# Patient Record
Sex: FEMALE | Race: WHITE | Employment: UNEMPLOYED | URBAN - METROPOLITAN AREA
[De-identification: names, ages, dates, MRNs, and addresses within clinical notes are randomized per-mention and may not be internally consistent; named-entity substitution may affect disease eponyms.]

---

## 2017-06-12 ENCOUNTER — OFFICE VISIT (OUTPATIENT)
Dept: INTERNAL MEDICINE CLINIC | Age: 32
End: 2017-06-12

## 2017-06-12 VITALS
SYSTOLIC BLOOD PRESSURE: 98 MMHG | TEMPERATURE: 97.2 F | DIASTOLIC BLOOD PRESSURE: 76 MMHG | WEIGHT: 118 LBS | BODY MASS INDEX: 22.28 KG/M2 | HEIGHT: 61 IN | HEART RATE: 65 BPM | RESPIRATION RATE: 15 BRPM | OXYGEN SATURATION: 99 %

## 2017-06-12 DIAGNOSIS — F41.9 ANXIETY: Primary | ICD-10-CM

## 2017-06-12 DIAGNOSIS — Z79.899 ENCOUNTER FOR LONG-TERM (CURRENT) USE OF MEDICATIONS: ICD-10-CM

## 2017-06-12 RX ORDER — SERTRALINE HYDROCHLORIDE 25 MG/1
25 TABLET, FILM COATED ORAL DAILY
Qty: 90 TAB | Refills: 1 | Status: SHIPPED | OUTPATIENT
Start: 2017-06-12 | End: 2017-12-09 | Stop reason: SDUPTHER

## 2017-06-12 NOTE — PROGRESS NOTES
Subjective:      Demian Mcdonnell is a 29 y.o. female who presents today for follow up of her anxiety. She was started on zoloft 25mg daily in 11/2016. She was also going to counseling still every 2 weeks. Has some family stressors, but she states that she coping with them well. She \"gets through the stress now\". She states that she is going out with friends more as well. Patient Active Problem List   Diagnosis Code    Pap smear for cervical cancer screening Z12.4    IBS (irritable bowel syndrome) K58.9    Anxiety F41.9    MDD (major depressive disorder), recurrent episode, moderate (HCC) F33.1    Skin cysts, generalized L72.9     Current Outpatient Prescriptions   Medication Sig Dispense Refill    sertraline (ZOLOFT) 25 mg tablet Take 1 Tab by mouth daily. 90 Tab 1    ergocalciferol (ERGOCALCIFEROL) 50,000 unit capsule Take 1 Cap by mouth every seven (7) days. 8 Cap 0        Review of Systems    Pertinent items are noted in HPI. Objective:     Visit Vitals    BP 98/76 (BP 1 Location: Left arm, BP Patient Position: Sitting)    Pulse 65    Temp 97.2 °F (36.2 °C) (Oral)    Resp 15    Ht 5' 1.25\" (1.556 m)    Wt 118 lb (53.5 kg)    LMP 05/29/2017 (Exact Date)    SpO2 99%    BMI 22.11 kg/m2     General appearance: alert, cooperative, no distress, appears stated age  Head: Normocephalic, without obvious abnormality, atraumatic  Lungs: clear to auscultation bilaterally  Heart: regular rate and rhythm, S1, S2 normal, no murmur, click, rub or gallop  Neurologic: Mental status: Alert, oriented, thought content appropriate, affect: stable and normal    Assessment/Plan:       1. Anxiety  -I evaluated and recommended to continue current doses of medications. 2. Encounter for long-term (current) use of medications    Orders Placed This Encounter    sertraline (ZOLOFT) 25 mg tablet     Sig: Take 1 Tab by mouth daily.      Dispense:  90 Tab     Refill:  1       Follow-up Disposition:     Follow up in 6 months     Return if symptoms worsen or fail to improve. Advised patient to call back or return to office if symptoms worsen/change/persist.     Discussed expected course/resolution/complications of diagnosis in detail with patient. Medication risks/benefits/costs/interactions/alternatives discussed with patient. Patient was given an after visit summary which includes diagnoses, current medications, & vitals. Patient expressed understanding with the diagnosis and plan.

## 2017-06-12 NOTE — PROGRESS NOTES
Chief Complaint   Patient presents with    Anxiety     6 month f/u     1. Have you been to the ER, urgent care clinic since your last visit? Hospitalized since your last visit? No    2. Have you seen or consulted any other health care providers outside of the [facility] since your last visit? Include any pap smears or colon screening.  No

## 2017-06-12 NOTE — MR AVS SNAPSHOT
Visit Information Date & Time Provider Department Dept. Phone Encounter #  
 6/12/2017  9:00 AM Eder Lehman MD [place] [address] [facility] [phone] [id number] Follow-up Instructions Return in about 6 months (around 12/12/2017) for anxiety. Upcoming Health Maintenance Date Due INFLUENZA AGE 9 TO ADULT 8/1/2017 PAP AKA CERVICAL CYTOLOGY 6/5/2018 DTaP/Tdap/Td series (2 - Td) 11/4/2026 Allergies as of 6/12/2017  Review Complete On: 6/12/2017 By: Eder Lehman MD  
 No Known Allergies Current Immunizations  Reviewed on 11/4/2016 Name Date Influenza Vaccine 10/1/2014 Tdap 11/4/2016  8:40 AM  
  
 Not reviewed this visit You Were Diagnosed With   
  
 Codes Comments Anxiety    -  Primary ICD-10-CM: F41.9 ICD-9-CM: 300.00 Encounter for long-term (current) use of medications     ICD-10-CM: Z79.899 ICD-9-CM: V58.69 Vitals BP Pulse Temp Resp Height(growth percentile) Weight(growth percentile) 98/76 (BP 1 Location: Left arm, BP Patient Position: Sitting) 65 97.2 °F (36.2 °C) (Oral) 15 5' 1.25\" (1.556 m) 118 lb (53.5 kg) LMP SpO2 BMI OB Status Smoking Status 05/29/2017 (Exact Date) 99% 22.11 kg/m2 Having regular periods Never Smoker Vitals History BMI and BSA Data Body Mass Index Body Surface Area  
 22.11 kg/m 2 1.52 m 2 Preferred Pharmacy Pharmacy Name Phone [address] [place] [address] Enid Nance AT [facility] 89. [phone] Your Updated Medication List  
  
   
This list is accurate as of: 6/12/17  9:32 AM.  Always use your most recent med list.  
  
  
  
  
 ergocalciferol 50,000 unit capsule Commonly known as:  ERGOCALCIFEROL Take 1 Cap by mouth every seven (7) days. sertraline 25 mg tablet Commonly known as:  ZOLOFT Take 1 Tab by mouth daily. Prescriptions Sent to Pharmacy  Refills  
 sertraline (ZOLOFT) 25 mg tablet 1  
 Sig: Take 1 Tab by mouth daily. Class: Normal  
 Pharmacy: [facility]eta [facility]  #: [phone] Route: Oral  
  
Follow-up Instructions Return in about 6 months (around 12/12/2017) for anxiety. Introducing [facility]! [employer] D[employer] introduces [employer] patient portal. Now you can access parts of your medical record, email your doctor's office, and request medication refills online. 1. In your internet browser, go to [email or web address] 2. Click on the First Time User? Click Here link in the Sign In box. You will see the New Member Sign Up page. 3. Enter your [employer] Access Code exactly as it appears below. You will not need to use this code after youve completed the sign-up process. If you do not sign up before the expiration date, you must request a new code. · [employer] Access Code: [email or web address] Expires: 9/10/2017  9:32 AM 
 
4. Enter the last four digits of your Social Security Number (xxxx) and Date of Birth (mm/dd/yyyy) as indicated and click Submit. You will be taken to the next sign-up page. 5. Create a [employer] ID. This will be your [employer] login ID and cannot be changed, so think of one that is secure and easy to remember. 6. Create a [employer] password. You can change your password at any time. 7. Enter your Password Reset Question and Answer. This can be used at a later time if you forget your password. 8. Enter your e-mail address. You will receive e-mail notification when new information is available in [address] 9. Click Sign Up. You can now view and download portions of your medical record. 10. Click the Download Summary menu link to download a portable copy of your medical information. If you have questions, please visit the Frequently Asked Questions section of the [employer] website. Remember, [employer] is NOT to be used for urgent needs. For medical emergencies, dial 911. Now available from your iPhone and Android! Please provide this summary of care documentation to your next provider. Your primary care clinician is listed as Faith Thomason. If you have any questions after today's visit, please call [phone].

## 2017-12-11 RX ORDER — SERTRALINE HYDROCHLORIDE 25 MG/1
TABLET, FILM COATED ORAL
Qty: 90 TAB | Refills: 0 | Status: SHIPPED | OUTPATIENT
Start: 2017-12-11 | End: 2018-02-16 | Stop reason: SDUPTHER

## 2018-02-16 ENCOUNTER — OFFICE VISIT (OUTPATIENT)
Dept: INTERNAL MEDICINE CLINIC | Age: 33
End: 2018-02-16

## 2018-02-16 VITALS
HEIGHT: 61 IN | SYSTOLIC BLOOD PRESSURE: 100 MMHG | HEART RATE: 78 BPM | DIASTOLIC BLOOD PRESSURE: 64 MMHG | BODY MASS INDEX: 22.47 KG/M2 | OXYGEN SATURATION: 100 % | TEMPERATURE: 98.8 F | WEIGHT: 119 LBS | RESPIRATION RATE: 14 BRPM

## 2018-02-16 DIAGNOSIS — F33.1 MDD (MAJOR DEPRESSIVE DISORDER), RECURRENT EPISODE, MODERATE (HCC): Chronic | ICD-10-CM

## 2018-02-16 DIAGNOSIS — Z79.899 ENCOUNTER FOR LONG-TERM (CURRENT) USE OF MEDICATIONS: ICD-10-CM

## 2018-02-16 DIAGNOSIS — F41.9 ANXIETY: Primary | ICD-10-CM

## 2018-02-16 DIAGNOSIS — Z00.00 ROUTINE ADULT HEALTH MAINTENANCE: ICD-10-CM

## 2018-02-16 RX ORDER — SERTRALINE HYDROCHLORIDE 25 MG/1
TABLET, FILM COATED ORAL
Qty: 90 TAB | Refills: 1 | Status: SHIPPED | OUTPATIENT
Start: 2018-02-16 | End: 2018-08-17 | Stop reason: DRUGHIGH

## 2018-02-16 NOTE — MR AVS SNAPSHOT
[address] [facility], [address] [place] [address] 
[phone] Patient: Marlen Randall MRN: [id number] PXB:[birth year] Visit Information Date & Time Provider Department Dept. Phone Encounter #  
 2/16/2018 11:20 AM [facility] [phone] [id number] Follow-up Instructions Return in about 6 months (around 8/16/2018) for pap and followup- physical appointment. Upcoming Health Maintenance Date Due  
 PAP AKA CERVICAL CYTOLOGY 6/5/2018 DTaP/Tdap/Td series (2 - Td) 11/4/2026 Allergies as of 2/16/2018  Review Complete On: 2/16/2018 By: Judith Fraser MD  
 No Known Allergies Current Immunizations  Reviewed on 11/4/2016 Name Date Influenza Vaccine 10/16/2017, 10/1/2014 Tdap 11/4/2016  8:40 AM  
  
 Not reviewed this visit You Were Diagnosed With   
  
 Codes Comments Anxiety    -  Primary ICD-10-CM: F41.9 ICD-9-CM: 300.00 Encounter for long-term (current) use of medications     ICD-10-CM: Z79.899 ICD-9-CM: V58.69 Routine adult health maintenance     ICD-10-CM: Z00.00 ICD-9-CM: V70.0 Vitals BP Pulse Temp Resp Height(growth percentile) Weight(growth percentile) 100/64 (BP 1 Location: Left arm, BP Patient Position: Sitting) 78 98.8 °F (37.1 °C) (Oral) 14 5' 1.25\" (1.556 m) 119 lb (54 kg) LMP SpO2 BMI OB Status Smoking Status 01/21/2018 (Exact Date) 100% 22.3 kg/m2 Having regular periods Never Smoker Vitals History BMI and BSA Data Body Mass Index Body Surface Area  
 22.3 kg/m 2 1.53 m 2 Preferred Pharmacy Pharmacy Name Phone [facility]ika Yen AT [facility]. [phone] Your Updated Medication List  
  
   
This list is accurate as of: 2/16/18 11:38 AM.  Always use your most recent med list.  
  
  
  
  
 sertraline 25 mg tablet Commonly known as:  ZOLOFT  
 TAKE 1 TABLET BY MOUTH DAILY Prescriptions Sent to Pharmacy Refills  
 sertraline (ZOLOFT) 25 mg tablet 1 Sig: TAKE 1 TABLET BY MOUTH DAILY Class: Normal  
 Pharmacy: [facility] Anton[facility]eta Berg  #: [phone] We Performed the Following CBC WITH AUTOMATED DIFF [80899 CPT(R)] LIPID PANEL [44913 CPT(R)] METABOLIC PANEL, COMPREHENSIVE [52853 CPT(R)] URINALYSIS W/ RFLX MICROSCOPIC [49195 CPT(R)] Follow-up Instructions Return in about 6 months (around 8/16/2018) for pap and followup- physical appointment. Introducing [facility]! [facility] introduces [employer] patient portal. Now you can access parts of your medical record, email your doctor's office, and request medication refills online. 1. In your internet browser, go to [email or web address] 2. Click on the First Time User? Click Here link in the Sign In box. You will see the New Member Sign Up page. 3. Enter your [employer] Access Code exactly as it appears below. You will not need to use this code after youve completed the sign-up process. If you do not sign up before the expiration date, you must request a new code. · [employer] Access Code: [email or web address] Expires: 5/17/2018 11:34 AM 
 
4. Enter the last four digits of your Social Security Number (xxxx) and Date of Birth (mm/dd/yyyy) as indicated and click Submit. You will be taken to the next sign-up page. 5. Create a [employer]anc[employer] ID. This will be your [employer] login ID and cannot be changed, so think of one that is secure and easy to remember. 6. Create a [employer] password. You can change your password at any time. 7. Enter your Password Reset Question and Answer. This can be used at a later time if you forget your password. 8. Enter your e-mail address. You will receive e-mail notification when new information is available in [address] 9. Click Sign Up. You can now view and download portions of your medical record. 10. Click the Download Summary menu link to download a portable copy of your medical information. If you have questions, please visit the Frequently Asked Questions section of the [employer] website. Remember, [employer] is NOT to be used for urgent needs. For medical emergencies, dial 911. Now available from your iPhone and Android! Please provide this summary of care documentation to your next provider. Your primary care clinician is listed as Faith Thomason. If you have any questions after today's visit, please call [phone].

## 2018-02-16 NOTE — ASSESSMENT & PLAN NOTE
Stable, based on history, physical exam and review of pertinent labs, studies and medications; meds reconciled; continue current treatment plan. Key Psychotherapeutic Meds             sertraline (ZOLOFT) 25 mg tablet  (Taking) TAKE 1 TABLET BY MOUTH DAILY        Other [address] [place] [address]     The patient is on no other behavioral health meds.         Lab Results   Component Value Date/Time    Sodium 141 11/03/2016 09:15 AM    Creatinine 0.69 11/03/2016 09:15 AM    TSH 1.530 11/03/2016 09:15 AM    WBC 4.2 11/03/2016 09:15 AM    ALT (SGPT) 11 11/03/2016 09:15 AM    AST (SGOT) 20 11/03/2016 09:15 AM

## 2018-02-16 NOTE — PROGRESS NOTES
Patient's identity verified with two patient identifiers (name and date of birth). 1. Have you been to the ER, urgent care clinic since your last visit? Hospitalized since your last visit? No  2. Have you seen or consulted any other health care providers outside of the [address] [place] [address] since your last visit? Include any pap smears or colon screening. No    Chief Complaint   Patient presents with    Anxiety     6 month follow up, doing wlel on Zoloft. Has been off x2 wks because ran out & complains of irritability.  Sneezing     x2 days with increased sneezing, clear mucous. Intermittent nasal congestion. Denies sore throat, coughing, body aches/fever, or ear pain. Fasting. Health Maintenance Due   Topic Date Due    Influenza Age 5 to Adult   Done per patient, 10/2017. 08/01/2017    PAP AKA CERVICAL CYTOLOGY   Due 6/2018, sees us (Dr. Elinor Coker) for this, No GYN care elsewhere. 06/05/2018     Reviewed record in preparation for visit and have obtained necessary documentation.     Wt Readings from Last 3 Encounters:   02/16/18 119 lb (54 kg)   06/12/17 118 lb (53.5 kg)   12/05/16 110 lb (49.9 kg)     Temp Readings from Last 3 Encounters:   02/16/18 98.8 °F (37.1 °C) (Oral)   06/12/17 97.2 °F (36.2 °C) (Oral)   12/05/16 98 °F (36.7 °C) (Oral)     BP Readings from Last 3 Encounters:   02/16/18 100/64   06/12/17 98/76   12/05/16 112/80     Pulse Readings from Last 3 Encounters:   02/16/18 78   06/12/17 65   12/05/16 70

## 2018-02-17 LAB
ALBUMIN SERPL-MCNC: 4.3 G/DL (ref 3.5–5.5)
ALBUMIN/GLOB SERPL: 1.5 {RATIO} (ref 1.2–2.2)
ALP SERPL-CCNC: 43 IU/L (ref 39–117)
ALT SERPL-CCNC: 10 IU/L (ref 0–32)
APPEARANCE UR: CLEAR
AST SERPL-CCNC: 17 IU/L (ref 0–40)
BASOPHILS # BLD AUTO: 0 X10E3/UL (ref 0–0.2)
BASOPHILS NFR BLD AUTO: 0 %
BILIRUB SERPL-MCNC: 0.5 MG/DL (ref 0–1.2)
BILIRUB UR QL STRIP: NEGATIVE
BUN SERPL-MCNC: 9 MG/DL (ref 6–20)
BUN/CREAT SERPL: 12 (ref 9–23)
CALCIUM SERPL-MCNC: 9 MG/DL (ref 8.7–10.2)
CHLORIDE SERPL-SCNC: 105 MMOL/L (ref 96–106)
CHOLEST SERPL-MCNC: 153 MG/DL (ref 100–199)
CO2 SERPL-SCNC: 21 MMOL/L (ref 18–29)
COLOR UR: YELLOW
CREAT SERPL-MCNC: 0.73 MG/DL (ref 0.57–1)
EOSINOPHIL # BLD AUTO: 0 X10E3/UL (ref 0–0.4)
EOSINOPHIL NFR BLD AUTO: 1 %
ERYTHROCYTE [DISTWIDTH] IN BLOOD BY AUTOMATED COUNT: 12.7 % (ref 12.3–15.4)
GFR SERPLBLD CREATININE-BSD FMLA CKD-EPI: 111 ML/MIN/1.73
GFR SERPLBLD CREATININE-BSD FMLA CKD-EPI: 128 ML/MIN/1.73
GLOBULIN SER CALC-MCNC: 2.9 G/DL (ref 1.5–4.5)
GLUCOSE SERPL-MCNC: 81 MG/DL (ref 65–99)
GLUCOSE UR QL: NEGATIVE
HCT VFR BLD AUTO: 38 % (ref 34–46.6)
HDLC SERPL-MCNC: 65 MG/DL
HGB BLD-MCNC: 12.3 G/DL (ref 11.1–15.9)
HGB UR QL STRIP: NEGATIVE
IMM GRANULOCYTES # BLD: 0 X10E3/UL (ref 0–0.1)
IMM GRANULOCYTES NFR BLD: 0 %
INTERPRETATION, 910389: NORMAL
KETONES UR QL STRIP: NEGATIVE
LDLC SERPL CALC-MCNC: 73 MG/DL (ref 0–99)
LEUKOCYTE ESTERASE UR QL STRIP: NEGATIVE
LYMPHOCYTES # BLD AUTO: 1.9 X10E3/UL (ref 0.7–3.1)
LYMPHOCYTES NFR BLD AUTO: 38 %
MCH RBC QN AUTO: 30.8 PG (ref 26.6–33)
MCHC RBC AUTO-ENTMCNC: 32.4 G/DL (ref 31.5–35.7)
MCV RBC AUTO: 95 FL (ref 79–97)
MICRO URNS: ABNORMAL
MONOCYTES # BLD AUTO: 0.4 X10E3/UL (ref 0.1–0.9)
MONOCYTES NFR BLD AUTO: 8 %
NEUTROPHILS # BLD AUTO: 2.7 X10E3/UL (ref 1.4–7)
NEUTROPHILS NFR BLD AUTO: 53 %
NITRITE UR QL STRIP: NEGATIVE
PH UR STRIP: 8.5 [PH] (ref 5–7.5)
PLATELET # BLD AUTO: 268 X10E3/UL (ref 150–379)
POTASSIUM SERPL-SCNC: 4.7 MMOL/L (ref 3.5–5.2)
PROT SERPL-MCNC: 7.2 G/DL (ref 6–8.5)
PROT UR QL STRIP: NEGATIVE
RBC # BLD AUTO: 3.99 X10E6/UL (ref 3.77–5.28)
SODIUM SERPL-SCNC: 141 MMOL/L (ref 134–144)
SP GR UR: 1.02 (ref 1–1.03)
TRIGL SERPL-MCNC: 75 MG/DL (ref 0–149)
UROBILINOGEN UR STRIP-MCNC: 0.2 MG/DL (ref 0.2–1)
VLDLC SERPL CALC-MCNC: 15 MG/DL (ref 5–40)
WBC # BLD AUTO: 5.1 X10E3/UL (ref 3.4–10.8)

## 2018-08-17 ENCOUNTER — OFFICE VISIT (OUTPATIENT)
Dept: INTERNAL MEDICINE CLINIC | Age: 33
End: 2018-08-17

## 2018-08-17 ENCOUNTER — HOSPITAL ENCOUNTER (OUTPATIENT)
Dept: LAB | Age: 33
Discharge: HOME | End: 2018-08-17
Payer: COMMERCIAL

## 2018-08-17 VITALS
DIASTOLIC BLOOD PRESSURE: 62 MMHG | TEMPERATURE: 97.3 F | RESPIRATION RATE: 13 BRPM | HEIGHT: 61 IN | WEIGHT: 121 LBS | SYSTOLIC BLOOD PRESSURE: 104 MMHG | HEART RATE: 73 BPM | BODY MASS INDEX: 22.84 KG/M2 | OXYGEN SATURATION: 100 %

## 2018-08-17 DIAGNOSIS — F41.8 DEPRESSION WITH ANXIETY: ICD-10-CM

## 2018-08-17 DIAGNOSIS — Z79.899 ENCOUNTER FOR LONG-TERM (CURRENT) USE OF MEDICATIONS: ICD-10-CM

## 2018-08-17 DIAGNOSIS — Z01.419 WELL WOMAN EXAM WITH ROUTINE GYNECOLOGICAL EXAM: Primary | ICD-10-CM

## 2018-08-17 PROCEDURE — 88175 CYTOPATH C/V AUTO FLUID REDO: CPT | Performed by: INTERNAL MEDICINE

## 2018-08-17 RX ORDER — SERTRALINE HYDROCHLORIDE 50 MG/1
50 TABLET, FILM COATED ORAL DAILY
Qty: 90 TAB | Refills: 0 | Status: SHIPPED | OUTPATIENT
Start: 2018-08-17 | End: 2019-02-20 | Stop reason: ALTCHOICE

## 2018-08-17 NOTE — PROGRESS NOTES
Patient's identity verified with two patient identifiers (name and date of birth). Reviewed record in preparation for visit and have obtained necessary documentation. 1. Have you been to the ER, urgent care clinic since your last visit? Hospitalized since your last visit? No  2. Have you seen or consulted any other health care providers outside of the [address] [place] [address] since your last visit? Include any pap smears or colon screening. No    Chief Complaint   Patient presents with    Complete Physical     Fasting. Pap smear due. Left buttocks pain, 5/10, \"nerve pain\", sharp, x2 days, occurs w/ walking.  Depression     6 month follow up, taking Zoloft, requesting refill. Complains of night sweats, wakes up a lot. Thinking she needs to increase dose. Fasting.     Health Maintenance Due   Topic Date Due    PAP AKA CERVICAL CYTOLOGY   Due, done today 06/05/2018       Wt Readings from Last 3 Encounters:   08/17/18 121 lb (54.9 kg)   02/16/18 119 lb (54 kg)   06/12/17 118 lb (53.5 kg)     Temp Readings from Last 3 Encounters:   08/17/18 97.3 °F (36.3 °C) (Oral)   02/16/18 98.8 °F (37.1 °C) (Oral)   06/12/17 97.2 °F (36.2 °C) (Oral)     BP Readings from Last 3 Encounters:   08/17/18 104/62   02/16/18 100/64   06/12/17 98/76     Pulse Readings from Last 3 Encounters:   08/17/18 73   02/16/18 78   06/12/17 65       Learning Assessment:  :     Learning Assessment 8/17/2018 6/12/2017 4/15/2015 5/17/2013   PRIMARY LEARNER Patient Patient Patient Patient   HIGHEST LEVEL OF EDUCATION - PRIMARY LEARNER  4 YEARS OF COLLEGE 4 YEARS OF COLLEGE 4 YEARS OF COLLEGE -   BARRIERS PRIMARY LEARNER NONE NONE NONE -   CO-LEARNER CAREGIVER No No No -   PRIMARY LANGUAGE ENGLISH ENGLISH ENGLISH ENGLISH   LEARNER PREFERENCE PRIMARY PICTURES DEMONSTRATION READING OTHER (COMMENT)     READING PICTURES - -   ANSWERED BY patient self patient patient   RELATIONSHIP SELF SELF SELF SELF       Depression Screening:  :     PHQ over the last two weeks 8/17/2018   Little interest or pleasure in doing things Nearly every day   Feeling down, depressed, irritable, or hopeless Several days   Total Score PHQ 2 4       Abuse Screening:  :     Abuse Screening Questionnaire 8/17/2018 6/12/2017 4/15/2015 4/1/2014   Do you ever feel afraid of your partner? N N N N   Are you in a relationship with someone who physically or mentally threatens you? N N N N   Is it safe for you to go home?  Andrew Sen

## 2018-08-17 NOTE — MR AVS SNAPSHOT
[address] [facility], [address] [place] [address] 
[phone] Patient: Amberly Kim MRN: [id number] NGY:[birth year] Visit Information Date & Time Provider Department Dept. Phone Encounter #  
 8/17/2018  8:20 AM Nora Rose MD [place] [address] [facility] [phone] Follow-up Instructions Return in about 6 months (around 2/17/2019). Upcoming Health Maintenance Date Due Influenza Age 5 to Adult 10/1/2018* PAP AKA CERVICAL CYTOLOGY 8/17/2021 DTaP/Tdap/Td series (2 - Td) 11/4/2026 *Topic was postponed. The date shown is not the original due date. Allergies as of 8/17/2018  Review Complete On: 8/17/2018 By: Nora Rose MD  
 No Known Allergies Current Immunizations  Reviewed on 8/17/2018 Name Date Influenza Vaccine 10/16/2017, 10/1/2014 Tdap 11/4/2016  8:40 AM  
  
 Reviewed by Nora Rose MD on 8/17/2018 at  8:33 AM  
You Were Diagnosed With   
  
 Codes Comments Well woman exam with routine gynecological exam    -  Primary ICD-10-CM: F86.516 ICD-9-CM: V72.31 Vitals BP Pulse Temp Resp Height(growth percentile) Weight(growth percentile) 104/62 (BP 1 Location: Left arm, BP Patient Position: Sitting) 73 97.3 °F (36.3 °C) (Oral) 13 5' 1.25\" (1.556 m) 121 lb (54.9 kg) LMP SpO2 BMI OB Status Smoking Status 07/22/2018 (Exact Date) 100% 22.68 kg/m2 Having regular periods Never Smoker Vitals History BMI and BSA Data Body Mass Index Body Surface Area  
 22.68 kg/m 2 1.54 m 2 Preferred Pharmacy Pharmacy Name Phone [address] [place] [address] Cici Roney AT [facility] [address] [facility] 89. [phone] Your Updated Medication List  
  
   
This list is accurate as of 8/17/18  9:04 AM.  Always use your most recent med list.  
  
  
  
  
 sertraline 50 mg tablet Commonly known as:  ZOLOFT Take 1 Tab by mouth daily. Prescriptions Sent to Pharmacy Refills  
 sertraline (ZOLOFT) 50 mg tablet 0 Sig: Take 1 Tab by mouth daily. Class: Normal  
 Pharmacy: [facility]  #: [phone] Route: Oral  
  
We Performed the Following PAP IG, [address] HPV ASCU, [id number]) [[id number] Custom] Follow-up Instructions Return in about 6 months (around 2/17/2019). Introducing [facility] HE[facility]! [employer] introduces [employer] patient portal. Now you can access parts of your medical record, email your doctor's office, and request medication refills online. 1. In your internet browser, go to [email or web address] 2. Click on the First Time User? Click Here link in the Sign In box. You will see the New Member Sign Up page. 3. Enter your [employer] Access Code exactly as it appears below. You will not need to use this code after youve completed the sign-up process. If you do not sign up before the expiration date, you must request a new code. · [employer] Access Code: [email or web address] Expires: 11/15/2018  8:33 AM 
 
4. Enter the last four digits of your Social Security Number (xxxx) and Date of Birth (mm/dd/yyyy) as indicated and click Submit. You will be taken to the next sign-up page. 5. Create a [employer] ID. This will be your [employer] login ID and cannot be changed, so think of one that is secure and easy to remember. 6. Create a [employer] password. You can change your password at any time. 7. Enter your Password Reset Question and Answer. This can be used at a later time if you forget your password. 8. Enter your e-mail address. You will receive e-mail notification when new information is available in [address] 9. Click Sign Up. You can now view and download portions of your medical record. 10. Click the Download Summary menu link to download a portable copy of your medical information. If you have questions, please visit the Frequently Asked Questions section of the [employer] website. Remember, [employer] is NOT to be used for urgent needs. For medical emergencies, dial 911. Now available from your iPhone and Android! Please provide this summary of care documentation to your next provider. Your primary care clinician is listed as Faith Thomason. If you have any questions after today's visit, please call [phone].

## 2018-08-17 NOTE — PROGRESS NOTES
Subjective:      Drake Schaffer is a 34 y.o. female who presents today for her well woman's exam and also for follow up of her anxiety. she is still going to counseling every other week. She is still exercising regularly. She is feeling a little more anhedonia. She just feels tired all the time which is her typical symptom of her depression. She is not suicidal.    Her menstrual cycles are very regular, every 3 weeks, lasts about 6 days. Patient Active Problem List   Diagnosis Code    Pap smear for cervical cancer screening Z12.4    IBS (irritable bowel syndrome) K58.9    Anxiety F41.9    MDD (major depressive disorder), recurrent episode, moderate (HCC) F33.1    Skin cysts, generalized L72.9     Current Outpatient Prescriptions   Medication Sig Dispense Refill    sertraline (ZOLOFT) 25 mg tablet TAKE 1 TABLET BY MOUTH DAILY 90 Tab 1        Review of Systems    A comprehensive review of systems was negative except for that written in the HPI. Objective:     Visit Vitals    /62 (BP 1 Location: Left arm, BP Patient Position: Sitting)    Pulse 73    Temp 97.3 °F (36.3 °C) (Oral)    Resp 13    Ht 5' 1.25\" (1.556 m)    Wt 121 lb (54.9 kg)    LMP 07/22/2018 (Exact Date)    SpO2 100%    BMI 22.68 kg/m2     General appearance: alert, cooperative, no distress, appears stated age  Head: Normocephalic, without obvious abnormality, atraumatic  Neck: supple, symmetrical, trachea midline, no adenopathy, no carotid bruit and no JVD  Lungs: clear to auscultation bilaterally  Breasts: normal appearance, no masses or tenderness  Heart: regular rate and rhythm, S1, S2 normal, no murmur, click, rub or gallop  Abdomen: soft, non-tender.  Bowel sounds normal. No masses,  no organomegaly  Pelvic: External genitalia normal, Vagina normal without discharge, cervix normal in appearance, no CMT, uterus normal size, shape, and consistency, no adnexal masses or tenderness, rectovaginal septum normal    Assessment/Plan:     1. Well woman exam with routine gynecological exam  -pap done today. - PAP IG, RFX HPV ASCU, 16&18,45([id number])    Also, she has additional complaints of the following --.     2. Depression with anxiety  -has increased anhedonia. She is still working with her counselor. She has very good insight  -will increase her zoloft from 25mg to 50mg daily at this time. 3. Encounter for long-term (current) use of medications    Orders Placed This Encounter    sertraline (ZOLOFT) 50 mg tablet     Sig: Take 1 Tab by mouth daily. Dispense:  90 Tab     Refill:  0    PAP IG, RFX HPV ASCU, 13&69,79([id number])     Order Specific Question:   Pap Source? Answer:   Cervical     Order Specific Question:   Total Hysterectomy? Answer:   No     Order Specific Question:   Supracervical Hysterectomy? Answer:   No     Order Specific Question:   Post Menopausal?     Answer:   No     Order Specific Question:   Hormone Therapy? Answer:   No     Order Specific Question:   IUD? Answer:   No     Order Specific Question:   Abnormal Bleeding? Answer:   No     Order Specific Question:   Pregnant     Answer:   No     Order Specific Question:   Post Partum? Answer:   No         Follow-up Disposition:     Follow up in 6 months     Return if symptoms worsen or fail to improve. Advised patient to call back or return to office if symptoms worsen/change/persist.     Discussed expected course/resolution/complications of diagnosis in detail with patient. Medication risks/benefits/costs/interactions/alternatives discussed with patient. Patient was given an after visit summary which includes diagnoses, current medications, & vitals. Patient expressed understanding with the diagnosis and plan.

## 2019-02-20 ENCOUNTER — OFFICE VISIT (OUTPATIENT)
Dept: INTERNAL MEDICINE CLINIC | Age: 34
End: 2019-02-20

## 2019-02-20 VITALS
OXYGEN SATURATION: 100 % | WEIGHT: 129 LBS | BODY MASS INDEX: 24.35 KG/M2 | HEART RATE: 83 BPM | RESPIRATION RATE: 16 BRPM | HEIGHT: 61 IN | SYSTOLIC BLOOD PRESSURE: 106 MMHG | TEMPERATURE: 98.9 F | DIASTOLIC BLOOD PRESSURE: 70 MMHG

## 2019-02-20 DIAGNOSIS — F41.8 ANXIETY WITH DEPRESSION: Primary | ICD-10-CM

## 2019-02-20 DIAGNOSIS — Z79.899 ENCOUNTER FOR LONG-TERM (CURRENT) USE OF MEDICATIONS: ICD-10-CM

## 2019-02-20 DIAGNOSIS — Z00.00 ROUTINE ADULT HEALTH MAINTENANCE: ICD-10-CM

## 2019-02-20 DIAGNOSIS — N92.6 ABNORMAL MENSES: ICD-10-CM

## 2019-02-20 RX ORDER — DULOXETIN HYDROCHLORIDE 30 MG/1
30 CAPSULE, DELAYED RELEASE ORAL DAILY
Qty: 7 CAP | Refills: 0 | Status: SHIPPED | OUTPATIENT
Start: 2019-02-20 | End: 2019-03-13

## 2019-02-20 RX ORDER — DULOXETIN HYDROCHLORIDE 60 MG/1
60 CAPSULE, DELAYED RELEASE ORAL DAILY
Qty: 30 CAP | Refills: 0 | Status: SHIPPED | OUTPATIENT
Start: 2019-02-20 | End: 2019-03-20 | Stop reason: SDUPTHER

## 2019-02-20 NOTE — PROGRESS NOTES
Smitha Fox is a 34 y.o. female Chief Complaint Patient presents with  Anxiety f/u- 6 month 1. Have you been to the ER, urgent care clinic since your last visit? Hospitalized since your last visit? No 
 
2. Have you seen or consulted any other health care providers outside of the [address] [place] [address] since your last visit? Include any pap smears or colon screening. No  
 
Visit Vitals /70 (BP 1 Location: Left arm, BP Patient Position: Sitting) Pulse 83 Temp 98.9 °F (37.2 °C) (Oral) Resp 16 Ht 5' 1.25\" (1.556 m) Wt 129 lb (58.5 kg) SpO2 100% BMI 24.18 kg/m² Sunny Briscoe LPN

## 2019-02-20 NOTE — PROGRESS NOTES
Subjective:  
  
Radha Huber is a 34 y.o. female who presents today for follow up of her anxiety. Her zoloft was increased on 8/17/18 at her last visit from 25mg to 50mg daily. She was also in counseling regularly. She stopped taking her zoloft about the end of august, 2018. She didn't think she needed it any more. She is very tearful and and her anxiety is back . She felt also that the zoloft was very blunting of her moods even on the low dose. She had celexa as a teen and she had significant side effects as well. She has continued to exercise regularly. Patient Active Problem List  
Diagnosis Code  Pap smear for cervical cancer screening Z12.4  
 IBS (irritable bowel syndrome) K58.9  Anxiety F41.9  MDD (major depressive disorder), recurrent episode, moderate (HCC) F33.1  Skin cysts, generalized L72.9 Current Outpatient Medications Medication Sig Dispense Refill  sertraline (ZOLOFT) 50 mg tablet Take 1 Tab by mouth daily. 90 Tab 0 Review of Systems Pertinent items are noted in HPI. Objective:  
 
Visit Vitals /70 (BP 1 Location: Left arm, BP Patient Position: Sitting) Pulse 83 Temp 98.9 °F (37.2 °C) (Oral) Resp 16 Ht 5' 1.25\" (1.556 m) Wt 129 lb (58.5 kg) LMP 01/18/2019 (Exact Date) SpO2 100% BMI 24.18 kg/m² General appearance: alert, cooperative, no distress, appears stated age Head: Normocephalic, without obvious abnormality, atraumatic Lungs: clear to auscultation bilaterally Heart: regular rate and rhythm, S1, S2 normal, no murmur, click, rub or gallop Neurologic: Mental status: Alert, oriented, thought content appropriate, affect: tearful. Assessment/Plan: 1. Anxiety with depression 
-continue with counseling 
-given trial of cymbalta. Start 30mg daily for 7 days, then increase to 60mg daily. Return in 3 weeks for evaluation.  
 
- TSH 3RD GENERATION 2.  Routine adult health maintenance 
 
- CBC WITH AUTOMATED DIFF 
 - METABOLIC PANEL, COMPREHENSIVE 
- LIPID PANEL 
- UA/M W/RFLX CULTURE, ROUTINE 3. Encounter for long-term (current) use of medications 4. Abnormal menses 
-recommended gyn referral 
 
- TSH 3RD GENERATION 
- REFERRAL TO OBSTETRICS AND GYNECOLOGY Follow-up Disposition:  
 
follow up in 3 weeks Return if symptoms worsen or fail to improve. Advised patient to call back or return to office if symptoms worsen/change/persist.  
 
Discussed expected course/resolution/complications of diagnosis in detail with patient. Medication risks/benefits/costs/interactions/alternatives discussed with patient. Patient was given an after visit summary which includes diagnoses, current medications, & vitals. Patient expressed understanding with the diagnosis and plan.

## 2019-02-22 LAB
ALBUMIN SERPL-MCNC: 4.9 G/DL (ref 3.5–5.5)
ALBUMIN/GLOB SERPL: 1.6 {RATIO} (ref 1.2–2.2)
ALP SERPL-CCNC: 53 IU/L (ref 39–117)
ALT SERPL-CCNC: 8 IU/L (ref 0–32)
APPEARANCE UR: CLEAR
AST SERPL-CCNC: 14 IU/L (ref 0–40)
BACTERIA #/AREA URNS HPF: NORMAL /[HPF]
BACTERIA UR CULT: NO GROWTH
BASOPHILS # BLD AUTO: 0 X10E3/UL (ref 0–0.2)
BASOPHILS NFR BLD AUTO: 0 %
BILIRUB SERPL-MCNC: 0.5 MG/DL (ref 0–1.2)
BILIRUB UR QL STRIP: NEGATIVE
BUN SERPL-MCNC: 12 MG/DL (ref 6–20)
BUN/CREAT SERPL: 14 (ref 9–23)
CALCIUM SERPL-MCNC: 9.8 MG/DL (ref 8.7–10.2)
CASTS URNS QL MICRO: NORMAL /LPF
CHLORIDE SERPL-SCNC: 104 MMOL/L (ref 96–106)
CHOLEST SERPL-MCNC: 169 MG/DL (ref 100–199)
CO2 SERPL-SCNC: 17 MMOL/L (ref 20–29)
COLOR UR: YELLOW
CREAT SERPL-MCNC: 0.83 MG/DL (ref 0.57–1)
EOSINOPHIL # BLD AUTO: 0 X10E3/UL (ref 0–0.4)
EOSINOPHIL NFR BLD AUTO: 1 %
EPI CELLS #/AREA URNS HPF: NORMAL /HPF
ERYTHROCYTE [DISTWIDTH] IN BLOOD BY AUTOMATED COUNT: 13.3 % (ref 12.3–15.4)
GLOBULIN SER CALC-MCNC: 3 G/DL (ref 1.5–4.5)
GLUCOSE SERPL-MCNC: 67 MG/DL (ref 65–99)
GLUCOSE UR QL: NEGATIVE
HCT VFR BLD AUTO: 38.6 % (ref 34–46.6)
HDLC SERPL-MCNC: 66 MG/DL
HGB BLD-MCNC: 13 G/DL (ref 11.1–15.9)
HGB UR QL STRIP: ABNORMAL
IMM GRANULOCYTES # BLD AUTO: 0 X10E3/UL (ref 0–0.1)
IMM GRANULOCYTES NFR BLD AUTO: 0 %
INTERPRETATION, 910389: NORMAL
KETONES UR QL STRIP: ABNORMAL
LDLC SERPL CALC-MCNC: 90 MG/DL (ref 0–99)
LEUKOCYTE ESTERASE UR QL STRIP: ABNORMAL
LYMPHOCYTES # BLD AUTO: 1.9 X10E3/UL (ref 0.7–3.1)
LYMPHOCYTES NFR BLD AUTO: 37 %
MCH RBC QN AUTO: 30.5 PG (ref 26.6–33)
MCHC RBC AUTO-ENTMCNC: 33.7 G/DL (ref 31.5–35.7)
MCV RBC AUTO: 91 FL (ref 79–97)
MICRO URNS: ABNORMAL
MONOCYTES # BLD AUTO: 0.4 X10E3/UL (ref 0.1–0.9)
MONOCYTES NFR BLD AUTO: 8 %
MUCOUS THREADS URNS QL MICRO: PRESENT
NEUTROPHILS # BLD AUTO: 2.7 X10E3/UL (ref 1.4–7)
NEUTROPHILS NFR BLD AUTO: 54 %
NITRITE UR QL STRIP: NEGATIVE
PH UR STRIP: 5.5 [PH] (ref 5–7.5)
PLATELET # BLD AUTO: 295 X10E3/UL (ref 150–379)
POTASSIUM SERPL-SCNC: 4.2 MMOL/L (ref 3.5–5.2)
PROT SERPL-MCNC: 7.9 G/DL (ref 6–8.5)
PROT UR QL STRIP: NEGATIVE
RBC # BLD AUTO: 4.26 X10E6/UL (ref 3.77–5.28)
RBC #/AREA URNS HPF: NORMAL /HPF
SODIUM SERPL-SCNC: 141 MMOL/L (ref 134–144)
SP GR UR: 1.03 (ref 1–1.03)
TRIGL SERPL-MCNC: 63 MG/DL (ref 0–149)
TSH SERPL DL<=0.005 MIU/L-ACNC: 1.41 UIU/ML (ref 0.45–4.5)
URINALYSIS REFLEX, 377202: ABNORMAL
UROBILINOGEN UR STRIP-MCNC: 0.2 MG/DL (ref 0.2–1)
VLDLC SERPL CALC-MCNC: 13 MG/DL (ref 5–40)
WBC # BLD AUTO: 5.1 X10E3/UL (ref 3.4–10.8)
WBC #/AREA URNS HPF: NORMAL /HPF

## 2019-03-13 ENCOUNTER — OFFICE VISIT (OUTPATIENT)
Dept: INTERNAL MEDICINE CLINIC | Age: 34
End: 2019-03-13

## 2019-03-13 VITALS
SYSTOLIC BLOOD PRESSURE: 106 MMHG | RESPIRATION RATE: 15 BRPM | HEART RATE: 97 BPM | WEIGHT: 125 LBS | HEIGHT: 61 IN | BODY MASS INDEX: 23.6 KG/M2 | TEMPERATURE: 99.4 F | DIASTOLIC BLOOD PRESSURE: 68 MMHG | OXYGEN SATURATION: 99 %

## 2019-03-13 DIAGNOSIS — Z79.899 ENCOUNTER FOR LONG-TERM (CURRENT) USE OF MEDICATIONS: ICD-10-CM

## 2019-03-13 DIAGNOSIS — J02.9 SORE THROAT: ICD-10-CM

## 2019-03-13 DIAGNOSIS — F41.8 ANXIETY WITH DEPRESSION: Primary | ICD-10-CM

## 2019-03-13 DIAGNOSIS — R50.9 LOW GRADE FEVER: ICD-10-CM

## 2019-03-13 LAB
FLUAV+FLUBV AG NOSE QL IA.RAPID: NEGATIVE POS/NEG
FLUAV+FLUBV AG NOSE QL IA.RAPID: NEGATIVE POS/NEG
S PYO AG THROAT QL: NEGATIVE
VALID INTERNAL CONTROL?: YES
VALID INTERNAL CONTROL?: YES

## 2019-03-13 NOTE — PROGRESS NOTES
Subjective:      Keisha Shukla is a 34 y.o. female who presents today for follow up of her depression with anxiety. She was weaned from zoloft to cymbalta as the zoloft was no longer helping her control her symptoms. She states that her anhedonia is much better. She has been on the full dose of cymbalta 60mg daily for 2 weeks now. She is taking it at night because it causes some sleepiness. She has also developed cold symptoms including head congestion, sore throat, cough with some production. Has used otc meds with some efficacy. Patient Active Problem List   Diagnosis Code    Pap smear for cervical cancer screening Z12.4    IBS (irritable bowel syndrome) K58.9    Anxiety F41.9    MDD (major depressive disorder), recurrent episode, moderate (HCC) F33.1    Skin cysts, generalized L72.9     Current Outpatient Medications   Medication Sig Dispense Refill    DULoxetine (CYMBALTA) 60 mg capsule Take 1 Cap by mouth daily. 30 Cap 0        Review of Systems    Pertinent items are noted in HPI.      Objective:     Visit Vitals  /68 (BP 1 Location: Left arm, BP Patient Position: Sitting)   Pulse 97   Temp 99.4 °F (37.4 °C) (Oral)   Resp 15   Ht 5' 1.25\" (1.556 m)   Wt 125 lb (56.7 kg)   LMP 02/20/2019 (Exact Date)   SpO2 99%   BMI 23.43 kg/m²     General appearance: alert, cooperative, no distress, appears stated age  Head: Normocephalic, without obvious abnormality, atraumatic  Ears: normal TM's and external ear canals AU  Throat: Lips, mucosa, and tongue normal. Teeth and gums normal and abnormal findings: mild oropharyngeal erythema  Neck: supple, symmetrical, trachea midline, no adenopathy, no carotid bruit and no JVD  Lungs: clear to auscultation bilaterally  Heart: regular rate and rhythm, S1, S2 normal, no murmur, click, rub or gallop  Neurologic: Mental status: Alert, oriented, thought content appropriate, affect: stable and normal    Results for orders placed or performed in visit on 03/13/19   AMB POC RAPID STREP A   Result Value Ref Range    VALID INTERNAL CONTROL POC Yes     Group A Strep Ag Negative Negative   AMB POC PARISH INFLUENZA A/B TEST   Result Value Ref Range    VALID INTERNAL CONTROL POC Yes     Influenza A Ag POC Negative Negative Pos/Neg    Influenza B Ag POC Negative Negative Pos/Neg      Assessment/Plan:       ICD-10-CM ICD-9-CM    1. Anxiety with depression F41.8 300.4    2. Sore throat J02.9 462 AMB POC RAPID STREP A   3. Low grade fever R50.9 780.60 AMB POC PARISH INFLUENZA A/B TEST   4. Encounter for long-term (current) use of medications Z79.899 V58.69         Plan:  -viral uri- flu and strep test negaitve  Continue supportive care.  -continue cymbalta 60mg daily. She is still in regular counseling. Orders Placed This Encounter    AMB POC RAPID STREP A    AMB POC PARISH INFLUENZA A/B TEST        Follow-up Disposition:     follow up in 4 months    Return if symptoms worsen or fail to improve. Advised patient to call back or return to office if symptoms worsen/change/persist.     Discussed expected course/resolution/complications of diagnosis in detail with patient. Medication risks/benefits/costs/interactions/alternatives discussed with patient. Patient was given an after visit summary which includes diagnoses, current medications, & vitals. Patient expressed understanding with the diagnosis and plan.

## 2019-10-07 ENCOUNTER — OFFICE VISIT (OUTPATIENT)
Dept: INTERNAL MEDICINE CLINIC | Age: 34
End: 2019-10-07

## 2019-10-07 VITALS
HEIGHT: 61 IN | DIASTOLIC BLOOD PRESSURE: 60 MMHG | WEIGHT: 124 LBS | TEMPERATURE: 97.5 F | HEART RATE: 64 BPM | OXYGEN SATURATION: 100 % | SYSTOLIC BLOOD PRESSURE: 102 MMHG | BODY MASS INDEX: 23.41 KG/M2 | RESPIRATION RATE: 16 BRPM

## 2019-10-07 DIAGNOSIS — F41.8 ANXIETY WITH DEPRESSION: Primary | ICD-10-CM

## 2019-10-07 DIAGNOSIS — Z79.899 ENCOUNTER FOR LONG-TERM (CURRENT) USE OF MEDICATIONS: ICD-10-CM

## 2019-10-07 NOTE — PROGRESS NOTES
Subjective:      Sanaz Schneider is a 30 y.o. female who presents today for follow up of her depression with anxiety. She hasn't taken her medication since June/july. She was having trouble with her pharmacy getting her a refill that she got frustrated and just went without her cymbalta. She states that she is doing fine. She is handling her stressors at work well. She is still going to counseling weekly. Due for:  -flu    Patient Active Problem List   Diagnosis Code    Pap smear for cervical cancer screening Z12.4    IBS (irritable bowel syndrome) K58.9    Anxiety F41.9    MDD (major depressive disorder), recurrent episode, moderate (HCC) F33.1    Skin cysts, generalized L72.9     Current Outpatient Medications   Medication Sig Dispense Refill    DULoxetine (CYMBALTA) 60 mg capsule TAKE 1 CAPSULE BY MOUTH DAILY 30 Cap 5        Review of Systems    Pertinent items are noted in HPI. Objective:     Visit Vitals  /60 (BP 1 Location: Left arm, BP Patient Position: Sitting)   Pulse 64   Temp 97.5 °F (36.4 °C) (Oral)   Resp 16   Ht 5' 1.22\" (1.555 m)   Wt 124 lb (56.2 kg)   LMP 09/21/2019   SpO2 100%   BMI 23.26 kg/m²     General appearance: alert, cooperative, no distress, appears stated age  Head: Normocephalic, without obvious abnormality, atraumatic  Neck: supple, symmetrical, trachea midline, no adenopathy, no carotid bruit and no JVD  Lungs: clear to auscultation bilaterally  Heart: regular rate and rhythm, S1, S2 normal, no murmur, click, rub or gallop  Neurologic: Mental status: Alert, oriented, thought content appropriate, affect: stable and normal    Assessment/Plan:     1. Anxiety with depression  -controlled without medication at this time. Encouraged her to continue with counseling. 2. Encounter for long-term (current) use of medications       Follow-up Disposition:     Follow up in 6 months     Return if symptoms worsen or fail to improve.    Advised patient to call back or return to office if symptoms worsen/change/persist.     Discussed expected course/resolution/complications of diagnosis in detail with patient. Medication risks/benefits/costs/interactions/alternatives discussed with patient. Patient was given an after visit summary which includes diagnoses, current medications, & vitals. Patient expressed understanding with the diagnosis and plan.

## 2019-10-07 NOTE — PROGRESS NOTES
Verified name and birth date for privacy precautions. Chart reviewed in preparation for today's visit. Chief Complaint   Patient presents with    Anxiety     stopped taking cymbalta 3 months ago. fasting          Health Maintenance Due   Topic    Influenza Age 5 to Adult          Wt Readings from Last 3 Encounters:   10/07/19 124 lb (56.2 kg)   03/13/19 125 lb (56.7 kg)   02/20/19 129 lb (58.5 kg)     Temp Readings from Last 3 Encounters:   10/07/19 97.5 °F (36.4 °C) (Oral)   03/13/19 99.4 °F (37.4 °C) (Oral)   02/20/19 98.9 °F (37.2 °C) (Oral)     BP Readings from Last 3 Encounters:   10/07/19 102/60   03/13/19 106/68   02/20/19 106/70     Pulse Readings from Last 3 Encounters:   10/07/19 64   03/13/19 97   02/20/19 83         Learning Assessment:  :     Learning Assessment 2/20/2019 8/17/2018 6/12/2017 4/15/2015 5/17/2013   PRIMARY LEARNER Patient Patient Patient Patient Patient   HIGHEST LEVEL OF EDUCATION - PRIMARY LEARNER  - 4 YEARS OF COLLEGE 4 YEARS OF COLLEGE 4 YEARS OF COLLEGE -   BARRIERS PRIMARY LEARNER - NONE NONE NONE -   CO-LEARNER CAREGIVER - No No No -   PRIMARY LANGUAGE ENGLISH ENGLISH ENGLISH ENGLISH ENGLISH   LEARNER PREFERENCE PRIMARY DEMONSTRATION PICTURES DEMONSTRATION READING OTHER (COMMENT)     - READING PICTURES - -   ANSWERED BY patient patient self patient patient   RELATIONSHIP SELF SELF SELF SELF SELF       Depression Screening:  :     3 most recent PHQ Screens 2/20/2019   Little interest or pleasure in doing things Not at all   Feeling down, depressed, irritable, or hopeless Not at all   Total Score PHQ 2 0       Fall Risk Assessment:  :     No flowsheet data found. Abuse Screening:  :     Abuse Screening Questionnaire 2/20/2019 8/17/2018 6/12/2017 4/15/2015 4/1/2014   Do you ever feel afraid of your partner? N N N N N   Are you in a relationship with someone who physically or mentally threatens you? N N N N N   Is it safe for you to go home?  Carrie Martinez       Coordination of Care Questionnaire:  :     1) Have you been to an emergency room, urgent care clinic since your last visit? no   Hospitalized since your last visit? no             2) Have you seen or consulted any other health care providers outside of [address] [place] [address] since your last visit? no  (Include any pap smears or colon screenings in this section.)    3) Do you have an Advance Directive on file? no        ------------------------------------------------

## 2020-03-10 ENCOUNTER — OFFICE VISIT (OUTPATIENT)
Dept: INTERNAL MEDICINE CLINIC | Age: 35
End: 2020-03-10

## 2020-03-10 VITALS
BODY MASS INDEX: 23.68 KG/M2 | TEMPERATURE: 98.8 F | OXYGEN SATURATION: 98 % | WEIGHT: 125.4 LBS | RESPIRATION RATE: 18 BRPM | HEIGHT: 61 IN | DIASTOLIC BLOOD PRESSURE: 60 MMHG | HEART RATE: 72 BPM | SYSTOLIC BLOOD PRESSURE: 80 MMHG

## 2020-03-10 DIAGNOSIS — J00 ACUTE RHINITIS: ICD-10-CM

## 2020-03-10 DIAGNOSIS — J00 HEAD COLD: Primary | ICD-10-CM

## 2020-03-10 DIAGNOSIS — R09.82 POST-NASAL DRAINAGE: ICD-10-CM

## 2020-03-10 NOTE — PROGRESS NOTES
29 yo female with URI sxs and \"mouth lesions\" for over a week. She has had scratchy throat and head congestion. Nasal discharge is recently green. She has been getting chilled easily and is tired. She has been taking Nyquil and Theraflu. She works in a [employer] with the [address], and sees all ages of patients. She has no children of her own. She has for the last few years been noting some seasonal allergy sxs. PE: WNWD female   T - 98.8   Phar - uvula injected   Neck - no palp nodes   Ears - L>R TMs dull   Nasal membranes - boggy L>R   Lungs - clear    Imp: Head cold   Rhinitis   Post-nasal drainage    Plan: Mucinex, Nasacort, Saline NS, hydration  _______________________  Expected course of current diagnosed problem(s) as well as expected progression and possible complications, and desired follow up with provider are discussed with patient. Patient is encouraged to be back in touch with any questions or concerns. Patient expresses understanding of plan of care. Patient is given AVS which includes diagnoses, current medications, vitals.

## 2020-03-10 NOTE — PATIENT INSTRUCTIONS
1. Mucinex (Guaifenesen) plain-Blue Box 600 mg. Take one twice daily with full glass water   Take for 10 days    2. Saline Nasal Spray - use liberally to flush post-nasal area; use as many times a day as desired. Keep spraying with head tilted back until you feel need to swallow    3. Drink lots of fluids (mainly water) to keep mucus thinner    4. If needed, for cough, we recommend Delsym or Nyquil cough syrup    5. Decongestants should only not be used as they actually contribute to overdrying/thickening of the mucus, and can raise the BP and overstimulate the heart    6.  Steroid nasal spray (Nasacort) - 2 sprays each nostril once daily; use with head in upright position

## 2020-03-10 NOTE — LETTER
NOTIFICATION RETURN TO WORK / SCHOOL 
 
3/10/2020 10:47 AM 
 
 
Ms. Kevin Zuñiga 
[facility] [address] [facility] [address] To Whom It May Concern: 
 
 
Kevin Zuñiga is currently under the care of Nabil James. She will return to work on: Wednesday, March 11, 2020. If there are questions or concerns please have the patient contact our office. Sincerely, Artie Back, NP

## 2020-04-15 ENCOUNTER — VIRTUAL VISIT (OUTPATIENT)
Dept: INTERNAL MEDICINE CLINIC | Age: 35
End: 2020-04-15

## 2020-04-15 DIAGNOSIS — R05.9 COUGH: ICD-10-CM

## 2020-04-15 DIAGNOSIS — F41.8 ANXIETY WITH DEPRESSION: Primary | ICD-10-CM

## 2020-04-15 DIAGNOSIS — Z79.899 ENCOUNTER FOR LONG-TERM (CURRENT) USE OF MEDICATIONS: ICD-10-CM

## 2020-04-15 NOTE — PROGRESS NOTES
Avis Sepulveda is a 30 y.o. female who was seen by synchronous (real-time) audio-video technology on 4/15/2020. She confirmed that, for purposes of billing, this is a virtual visit with her provider for which we will submit a claim for reimbursement to her insurance company. She is aware that she will be responsible for any copays, coinsurance amounts or other amounts not covered by her insurance company. Do you accept - YES    This visit was completed was completed virtually using D[employer]. Me      Subjective:   Avis Sepulveda was seen for her depression. She had a uri about 1 month ago and now has a dry cough that is worse at night. Lots of throat clearing and some cough during the day. Has not resolved since her cold. She had stopped her cymbalta in the fall. She is still meeting with her counselor regularly. She is a  and her job is very stressful. She is handling the stressors of working different schedule, some from home, some at work. She is still exercising. Prior to Admission medications    Medication Sig Start Date End Date Taking? Authorizing Provider   mv-min/iron/folic/calcium/vitK (WOMEN'S MULTIVITAMIN PO) Take  by mouth. Yes Provider, Historical   MELATONIN PO Take  by mouth nightly. Yes Provider, Historical     No Known Allergies    Patient Active Problem List   Diagnosis Code    Pap smear for cervical cancer screening Z12.4    IBS (irritable bowel syndrome) K58.9    Anxiety F41.9    MDD (major depressive disorder), recurrent episode, moderate (HCC) F33.1    Skin cysts, generalized L72.9     Current Outpatient Medications   Medication Sig Dispense Refill    mv-min/iron/folic/calcium/vitK (WOMEN'S MULTIVITAMIN PO) Take  by mouth.  MELATONIN PO Take  by mouth nightly. ROS - per HPI      Objective:     General: alert, cooperative, no distress.  Some throat clearing during conversation   Mental  status: pe mental status_general use: normal mood, behavior, speech, dress, motor activity, and thought processes, able to follow commands   Eyes: EOM intact, normal sclera   Mouth: not examined   Neck: no visualized mass   Resp: PULM - obs findings: normal effort and no respiratory distress   Neuro: neuro - obs: no gross deficits   Musculoskeletal: normal ROM of neck   Skin: skin exam: no discoloration or lesions of concern on visible areas   Psychiatric: normal affect, no hallucinations       Assessment & Plan:   Diagnoses and all orders for this visit:    1. Anxiety with depression  -appears to be handling stressors of the pandemic and changes with work quite well  -continue with counseling regularly  -discussed restarting cymbalta with patient. She states that she is coping well at this time. Will hold on use of medications. 2. Cough  -started with her URI about 1 month ago. May be post infectious, but may also have an allergy post nasal drip component as patient clears her throat often.  -recommended she start flonase 2 sprays each nostril daily in the morning and zyrtec 10mg at bedtime. May use delsym at night for cough if needed. 3. Encounter for long-term (current) use of medications          CPT Codes 39117-10605 for Established Patients may apply to this Telehealth Visit  Time-based coding, delete if not needed: I spent at least 15 minutes with this established patient, and >50% of the time was spent counseling and/or coordinating care regarding anxiety with depression and cough    Due to this being a TeleHealth evaluation, many elements of the physical examination are unable to be assessed.      Pursuant to the emergency declaration under the [facility] waiver authority and the [employer] and [employer] Act, this Virtual  Visit was conducted, with patient's consent, to reduce the patient's risk of exposure to COVID-19 and provide continuity of care for an established patient. Services were provided through a video synchronous discussion virtually to substitute for in-person clinic visit. We discussed the expected course, resolution and complications of the diagnosis(es) in detail. Medication risks, benefits, costs, interactions, and alternatives were discussed as indicated. I advised her to contact the office if her condition worsens, changes or fails to improve as anticipated. She expressed understanding with the diagnosis(es) and plan.      Eugene Gamez MD

## 2023-02-17 ENCOUNTER — OFFICE VISIT (OUTPATIENT)
Dept: INTERNAL MEDICINE CLINIC | Age: 38
End: 2023-02-17
Payer: COMMERCIAL

## 2023-02-17 VITALS
SYSTOLIC BLOOD PRESSURE: 106 MMHG | OXYGEN SATURATION: 99 % | HEART RATE: 75 BPM | DIASTOLIC BLOOD PRESSURE: 71 MMHG | WEIGHT: 124 LBS | HEIGHT: 61 IN | TEMPERATURE: 98.2 F | BODY MASS INDEX: 23.41 KG/M2 | RESPIRATION RATE: 16 BRPM

## 2023-02-17 DIAGNOSIS — F41.8 ANXIETY WITH DEPRESSION: Primary | ICD-10-CM

## 2023-02-17 PROCEDURE — 99213 OFFICE O/P EST LOW 20 MIN: CPT | Performed by: INTERNAL MEDICINE

## 2023-02-17 RX ORDER — SERTRALINE HYDROCHLORIDE 50 MG/1
TABLET, FILM COATED ORAL
Qty: 30 TABLET | Refills: 1 | Status: SHIPPED | OUTPATIENT
Start: 2023-02-17

## 2023-02-17 NOTE — PROGRESS NOTES
Verified name and birth date for privacy precautions. Chart reviewed in preparation for today's visit. Chief Complaint   Patient presents with    Depression          Health Maintenance Due   Topic    Hepatitis C Screening     COVID-19 Vaccine (1)    Depression Monitoring     Flu Vaccine (1)         Wt Readings from Last 3 Encounters:   02/17/23 124 lb (56.2 kg)   03/10/20 125 lb 6.4 oz (56.9 kg)   10/07/19 124 lb (56.2 kg)     Temp Readings from Last 3 Encounters:   02/17/23 98.2 °F (36.8 °C) (Temporal)   03/10/20 98.8 °F (37.1 °C) (Oral)   10/07/19 97.5 °F (36.4 °C) (Oral)     BP Readings from Last 3 Encounters:   02/17/23 106/71   03/10/20 (!) 80/60   10/07/19 102/60     Pulse Readings from Last 3 Encounters:   02/17/23 75   03/10/20 72   10/07/19 64         Learning Assessment:  :     Learning Assessment 2/20/2019 8/17/2018 6/12/2017 4/15/2015 5/17/2013   PRIMARY LEARNER Patient Patient Patient Patient Patient   HIGHEST LEVEL OF EDUCATION - PRIMARY LEARNER  - 4 YEARS OF COLLEGE 4 YEARS OF COLLEGE 4 YEARS OF COLLEGE -   BARRIERS PRIMARY LEARNER - NONE NONE NONE -   CO-LEARNER CAREGIVER - No No No -   PRIMARY LANGUAGE ENGLISH ENGLISH ENGLISH ENGLISH ENGLISH   LEARNER PREFERENCE PRIMARY DEMONSTRATION PICTURES DEMONSTRATION READING OTHER (COMMENT)     - READING PICTURES - -   ANSWERED BY patient patient self patient patient   RELATIONSHIP SELF SELF SELF SELF SELF       Depression Screening:  :     3 most recent PHQ Screens 2/17/2023   Little interest or pleasure in doing things Not at all   Feeling down, depressed, irritable, or hopeless More than half the days   Total Score PHQ 2 2       Fall Risk Assessment:  :     No flowsheet data found. Abuse Screening:  :     Abuse Screening Questionnaire 2/17/2023 2/20/2019 8/17/2018 6/12/2017 4/15/2015 4/1/2014   Do you ever feel afraid of your partner? N N N N N N   Are you in a relationship with someone who physically or mentally threatens you?  N N N N N N   Is it safe for you to go home?  Kalani Maloney

## 2023-02-17 NOTE — PROGRESS NOTES
Assessment/Plan:     1. Anxiety with depression  -continue counseling  -started on zoloft 50mg daily.     - CBC WITH AUTOMATED DIFF; Future  - METABOLIC PANEL, COMPREHENSIVE; Future  - TSH 3RD GENERATION; Future  - TSH 3RD GENERATION  - METABOLIC PANEL, COMPREHENSIVE  - CBC WITH AUTOMATED DIFF         Follow-up Disposition:     follow up in 6 weeks. Disclaimer:  Return if symptoms worsen or fail to improve. Advised patient to call back or return to office if symptoms worsen/change/persist.     Discussed expected course/resolution/complications of diagnosis in detail with patient. Medication risks/benefits/costs/interactions/alternatives discussed with patient. Patient was given an after visit summary which includes diagnoses, current medications, & vitals. Patient expressed understanding with the diagnosis and plan. Subjective:      Memo Gonsales is a 30 y.o. female who presents today for recurrence of her anxiety. -patient has not been seen since 10/7/2019. At that time she stopped use of her cymbalta in June/July 2019 and had been doing well.     -she has noted that her anxiety has started to escalate in the past few months. -started back with counselor 1 month ago,  Darian Gerber,  twice per month.       Objective:       Visit Vitals  /71   Pulse 75   Temp 98.2 °F (36.8 °C) (Temporal)   Resp 16   Ht 5' 1.25\" (1.556 m)   Wt 124 lb (56.2 kg)   LMP 02/07/2023   SpO2 99%   BMI 23.24 kg/m²     General appearance: alert, cooperative, no distress, appears stated age  Neurologic: Mental status: Alert, oriented, thought content appropriate, affect: stable

## 2023-02-18 LAB
ALBUMIN SERPL-MCNC: 4.1 G/DL (ref 3.5–5)
ALBUMIN/GLOB SERPL: 1.2 (ref 1.1–2.2)
ALP SERPL-CCNC: 56 U/L (ref 45–117)
ALT SERPL-CCNC: 16 U/L (ref 12–78)
ANION GAP SERPL CALC-SCNC: 3 MMOL/L (ref 5–15)
AST SERPL-CCNC: 14 U/L (ref 15–37)
BASOPHILS # BLD: 0 K/UL (ref 0–0.1)
BASOPHILS NFR BLD: 1 % (ref 0–1)
BILIRUB SERPL-MCNC: 0.5 MG/DL (ref 0.2–1)
BUN SERPL-MCNC: 12 MG/DL (ref 6–20)
BUN/CREAT SERPL: 15 (ref 12–20)
CALCIUM SERPL-MCNC: 9.5 MG/DL (ref 8.5–10.1)
CHLORIDE SERPL-SCNC: 110 MMOL/L (ref 97–108)
CO2 SERPL-SCNC: 24 MMOL/L (ref 21–32)
CREAT SERPL-MCNC: 0.8 MG/DL (ref 0.55–1.02)
DIFFERENTIAL METHOD BLD: NORMAL
EOSINOPHIL # BLD: 0 K/UL (ref 0–0.4)
EOSINOPHIL NFR BLD: 0 % (ref 0–7)
ERYTHROCYTE [DISTWIDTH] IN BLOOD BY AUTOMATED COUNT: 12.9 % (ref 11.5–14.5)
GLOBULIN SER CALC-MCNC: 3.4 G/DL (ref 2–4)
GLUCOSE SERPL-MCNC: 87 MG/DL (ref 65–100)
HCT VFR BLD AUTO: 41.5 % (ref 35–47)
HGB BLD-MCNC: 12.8 G/DL (ref 11.5–16)
IMM GRANULOCYTES # BLD AUTO: 0 K/UL (ref 0–0.04)
IMM GRANULOCYTES NFR BLD AUTO: 0 % (ref 0–0.5)
LYMPHOCYTES # BLD: 1.9 K/UL (ref 0.8–3.5)
LYMPHOCYTES NFR BLD: 28 % (ref 12–49)
MCH RBC QN AUTO: 29.9 PG (ref 26–34)
MCHC RBC AUTO-ENTMCNC: 30.8 G/DL (ref 30–36.5)
MCV RBC AUTO: 97 FL (ref 80–99)
MONOCYTES # BLD: 0.4 K/UL (ref 0–1)
MONOCYTES NFR BLD: 6 % (ref 5–13)
NEUTS SEG # BLD: 4.5 K/UL (ref 1.8–8)
NEUTS SEG NFR BLD: 65 % (ref 32–75)
NRBC # BLD: 0 K/UL (ref 0–0.01)
NRBC BLD-RTO: 0 PER 100 WBC
PLATELET # BLD AUTO: 313 K/UL (ref 150–400)
POTASSIUM SERPL-SCNC: 3.8 MMOL/L (ref 3.5–5.1)
PROT SERPL-MCNC: 7.5 G/DL (ref 6.4–8.2)
RBC # BLD AUTO: 4.28 M/UL (ref 3.8–5.2)
SODIUM SERPL-SCNC: 137 MMOL/L (ref 136–145)
TSH SERPL DL<=0.05 MIU/L-ACNC: 1.02 UIU/ML (ref 0.36–3.74)
WBC # BLD AUTO: 7 K/UL (ref 3.6–11)

## 2023-03-27 NOTE — PROGRESS NOTES
Assessment/Plan:     1. Anxiety with depression  -mild improvement with initiation of zoloft 50mg daily. -will increase dose of zoloft to 100mg daily.   -continue counseling. Follow-up Disposition:     follow up 1 months. Disclaimer:  Return if symptoms worsen or fail to improve. Advised patient to call back or return to office if symptoms worsen/change/persist.     Discussed expected course/resolution/complications of diagnosis in detail with patient. Medication risks/benefits/costs/interactions/alternatives discussed with patient. Patient was given an after visit summary which includes diagnoses, current medications, & vitals. Patient expressed understanding with the diagnosis and plan. Subjective:      Riana Tarango is a 30 y.o. female who presents today for follow up of her anxiety.       -she was started on zoloft 50mg daily on 2/17/2023     -she notes that the little sharp pains all over her body, especially at night have improved. She is sleeping better.       -mood is still having ups and downs. Some difficult concentrating at work due to anxiety. Then it makes her anxious that she can't concentrate.     -counseling twice montly.      -she denies any ideation of self harm or harming others.        Objective:       Visit Vitals  /78   Pulse 74   Temp 98.6 °F (37 °C) (Temporal)   Resp 18   Ht 5' 1\" (1.549 m)   Wt 124 lb 9.6 oz (56.5 kg)   LMP 03/04/2023 (Exact Date)   SpO2 98%   BMI 23.54 kg/m²     General appearance: alert, cooperative, no distress, appears stated age  Lungs: clear to auscultation bilaterally  Heart: regular rate and rhythm, S1, S2 normal, no murmur, click, rub or gallop  Neurologic: Mental status: Alert, oriented, thought content appropriate, affect: normal No

## 2023-03-28 ENCOUNTER — OFFICE VISIT (OUTPATIENT)
Dept: INTERNAL MEDICINE CLINIC | Age: 38
End: 2023-03-28
Payer: COMMERCIAL

## 2023-03-28 VITALS
HEART RATE: 74 BPM | TEMPERATURE: 98.6 F | BODY MASS INDEX: 23.53 KG/M2 | RESPIRATION RATE: 18 BRPM | WEIGHT: 124.6 LBS | DIASTOLIC BLOOD PRESSURE: 78 MMHG | SYSTOLIC BLOOD PRESSURE: 105 MMHG | OXYGEN SATURATION: 98 % | HEIGHT: 61 IN

## 2023-03-28 DIAGNOSIS — F41.8 ANXIETY WITH DEPRESSION: Primary | ICD-10-CM

## 2023-03-28 PROCEDURE — 99213 OFFICE O/P EST LOW 20 MIN: CPT | Performed by: INTERNAL MEDICINE

## 2023-03-28 RX ORDER — SERTRALINE HYDROCHLORIDE 100 MG/1
100 TABLET, FILM COATED ORAL DAILY
Qty: 30 TABLET | Refills: 0 | Status: SHIPPED | OUTPATIENT
Start: 2023-03-28

## 2023-03-28 NOTE — PROGRESS NOTES
Chief Complaint   Patient presents with    Follow-up    Anxiety     Blood pressure 105/78, pulse 74, temperature 98.6 °F (37 °C), temperature source Temporal, resp. rate 18, height 5' 1\" (1.549 m), weight 124 lb 9.6 oz (56.5 kg), last menstrual period 03/04/2023, SpO2 98 %.

## 2023-04-25 ENCOUNTER — OFFICE VISIT (OUTPATIENT)
Dept: INTERNAL MEDICINE CLINIC | Age: 38
End: 2023-04-25
Payer: COMMERCIAL

## 2023-04-25 VITALS
WEIGHT: 124 LBS | BODY MASS INDEX: 23.41 KG/M2 | OXYGEN SATURATION: 99 % | HEIGHT: 61 IN | TEMPERATURE: 98.4 F | RESPIRATION RATE: 16 BRPM | SYSTOLIC BLOOD PRESSURE: 110 MMHG | DIASTOLIC BLOOD PRESSURE: 72 MMHG | HEART RATE: 71 BPM

## 2023-04-25 DIAGNOSIS — Z79.899 ENCOUNTER FOR LONG-TERM (CURRENT) USE OF MEDICATIONS: ICD-10-CM

## 2023-04-25 DIAGNOSIS — F41.8 ANXIETY WITH DEPRESSION: Primary | ICD-10-CM

## 2023-04-25 PROCEDURE — 99213 OFFICE O/P EST LOW 20 MIN: CPT | Performed by: INTERNAL MEDICINE

## 2023-04-25 RX ORDER — DULOXETIN HYDROCHLORIDE 30 MG/1
30 CAPSULE, DELAYED RELEASE ORAL DAILY
Qty: 90 CAPSULE | Refills: 0 | Status: SHIPPED | OUTPATIENT
Start: 2023-04-25

## 2023-04-25 NOTE — PROGRESS NOTES
Verified name and birth date for privacy precautions. Chart reviewed in preparation for today's visit.      Chief Complaint   Patient presents with    Medication Evaluation          Health Maintenance Due   Topic    Hepatitis C Screening     COVID-19 Vaccine (1)    Varicella Vaccine (1 of 2 - 2-dose childhood series)         Wt Readings from Last 3 Encounters:   04/25/23 124 lb (56.2 kg)   03/28/23 124 lb 9.6 oz (56.5 kg)   02/17/23 124 lb (56.2 kg)     Temp Readings from Last 3 Encounters:   04/25/23 98.4 °F (36.9 °C) (Temporal)   03/28/23 98.6 °F (37 °C) (Temporal)   02/17/23 98.2 °F (36.8 °C) (Temporal)     BP Readings from Last 3 Encounters:   04/25/23 110/72   03/28/23 105/78   02/17/23 106/71     Pulse Readings from Last 3 Encounters:   04/25/23 71   03/28/23 74   02/17/23 75         Learning Assessment:  :     Learning Assessment 2/20/2019 8/17/2018 6/12/2017 4/15/2015 5/17/2013   PRIMARY LEARNER Patient Patient Patient Patient Patient   HIGHEST LEVEL OF EDUCATION - PRIMARY LEARNER  - 4 YEARS OF COLLEGE 4 YEARS OF COLLEGE 4 YEARS OF COLLEGE -   BARRIERS PRIMARY LEARNER - NONE NONE NONE -   CO-LEARNER CAREGIVER - No No No -   PRIMARY LANGUAGE ENGLISH ENGLISH ENGLISH ENGLISH ENGLISH   LEARNER PREFERENCE PRIMARY DEMONSTRATION PICTURES DEMONSTRATION READING OTHER (COMMENT)     - READING PICTURES - -   ANSWERED BY patient patient self patient patient   RELATIONSHIP SELF SELF SELF SELF SELF       Depression Screening:  :     3 most recent PHQ Screens 4/25/2023   Little interest or pleasure in doing things Not at all   Feeling down, depressed, irritable, or hopeless Not at all   Total Score PHQ 2 0   Trouble falling or staying asleep, or sleeping too much -   Feeling tired or having little energy -   Poor appetite, weight loss, or overeating -   Feeling bad about yourself - or that you are a failure or have let yourself or your family down -   Trouble concentrating on things such as school, work, reading, or watching TV -   Moving or speaking so slowly that other people could have noticed; or the opposite being so fidgety that others notice -   Thoughts of being better off dead, or hurting yourself in some way -   PHQ 9 Score -   How difficult have these problems made it for you to do your work, take care of your home and get along with others -       Fall Risk Assessment:  :     No flowsheet data found. Abuse Screening:  :     Abuse Screening Questionnaire 4/25/2023 2/17/2023 2/20/2019 8/17/2018 6/12/2017 4/15/2015 4/1/2014   Do you ever feel afraid of your partner? N N N N N N N   Are you in a relationship with someone who physically or mentally threatens you? N N N N N N N   Is it safe for you to go home?  [employer]

## 2023-04-25 NOTE — PROGRESS NOTES
Assessment/Plan:     1. Anxiety with depression  -stopped zoloft due to side effects.  -encouraged counseling.  -embarking on 2 month vacation to visit family in [place].   -recommended switching antidepressant to cymbalta 30mg daily. She has also used this in the past without complications. 2. Encounter for long-term (current) use of medications    Orders Placed This Encounter    DULoxetine (CYMBALTA) 30 mg capsule     Sig: Take 1 Capsule by mouth daily. Dispense:  90 Capsule     Refill:  0         Follow-up Disposition:     follow up 2 months upon her return from her trip        Disclaimer:  Return if symptoms worsen or fail to improve. Advised patient to call back or return to office if symptoms worsen/change/persist.     Discussed expected course/resolution/complications of diagnosis in detail with patient. Medication risks/benefits/costs/interactions/alternatives discussed with patient. Patient was given an after visit summary which includes diagnoses, current medications, & vitals. Patient expressed understanding with the diagnosis and plan. Subjective:      Cleve Segovia is a 30 y.o. female who presents today for anxiety with depression      On 3/28/2023 her zoloft dose was increased to 100mg daily.      -stopped taking her zoloft about a week ago. Due to dry mouth, insomnia, and more depressed and lethargic. Symptoms resolved with stopping medication.     -still in therapy.      -walking more.      Objective:       Visit Vitals  /72   Pulse 71   Temp 98.4 °F (36.9 °C) (Temporal)   Resp 16   Ht 5' 1\" (1.549 m)   Wt 124 lb (56.2 kg)   LMP 03/04/2023 (Exact Date)   SpO2 99%   BMI 23.43 kg/m²     General appearance: alert, cooperative, no distress, appears stated age  Head: Normocephalic, without obvious abnormality, atraumatic  Neurologic: Mental status: Alert, oriented, thought content appropriate, affect: stable and normal

## 2023-05-18 RX ORDER — DULOXETIN HYDROCHLORIDE 30 MG/1
30 CAPSULE, DELAYED RELEASE ORAL DAILY
COMMUNITY
Start: 2023-04-25

## 2023-07-24 RX ORDER — DULOXETIN HYDROCHLORIDE 30 MG/1
30 CAPSULE, DELAYED RELEASE ORAL DAILY
Qty: 30 CAPSULE | Refills: 0 | Status: SHIPPED | OUTPATIENT
Start: 2023-07-24 | End: 2023-08-23

## 2023-07-24 NOTE — TELEPHONE ENCOUNTER
Patient would like for Dr. Damien Flores to cancel the refill of Duloxetine -- she never started it -- is going to therapy for now. Patient scheduled a physical for February 9th.

## 2023-08-23 RX ORDER — DULOXETIN HYDROCHLORIDE 30 MG/1
30 CAPSULE, DELAYED RELEASE ORAL DAILY
Qty: 30 CAPSULE | Refills: 0 | Status: SHIPPED | OUTPATIENT
Start: 2023-08-23

## 2023-09-27 RX ORDER — DULOXETIN HYDROCHLORIDE 30 MG/1
30 CAPSULE, DELAYED RELEASE ORAL DAILY
Qty: 30 CAPSULE | Refills: 0 | OUTPATIENT
Start: 2023-09-27

## 2024-02-08 NOTE — PROGRESS NOTES
Assessment/Plan:     1. Annual physical exam  -encouraged her to follow up with her gyn, [facility].  -fasting labs today    - CBC with Auto Differential; Future  - Comprehensive Metabolic Panel; Future  - Lipid Panel; Future  - Urinalysis with Reflex to Culture; Future    Also, she has additional complaints of the following --.     2. MDD (major depressive disorder), recurrent episode, moderate (HCC)  -will give trial of wellbutrin XL 150mg daily.   -continue regular counseling.   -return in 3 weeks for follow up     - TSH; Future    3. Screening for HIV (human immunodeficiency virus)    - HIV 1/2 Ag/Ab, 4TH Generation,W Rflx Confirm; Future    4. Screening for viral disease    - Hepatitis C Antibody; Future     Orders Placed This Encounter    CBC with Auto Differential     Standing Status:   Future     Number of Occurrences:   1     Standing Expiration Date:   2/9/2025    Comprehensive Metabolic Panel     Standing Status:   Future     Number of Occurrences:   1     Standing Expiration Date:   2/9/2025    Lipid Panel     Standing Status:   Future     Number of Occurrences:   1     Standing Expiration Date:   2/9/2025     Order Specific Question:   Is Patient Fasting?/# of Hours     Answer:   8     Order Specific Question:   Has the patient fasted?     Answer:   Yes    Urinalysis with Reflex to Culture     Standing Status:   Future     Number of Occurrences:   1     Standing Expiration Date:   2/9/2025     Order Specific Question:   SPECIFY(EX-CATH,MIDSTREAM,CYSTO,ETC)?     Answer:   midstream    TSH     Standing Status:   Future     Number of Occurrences:   1     Standing Expiration Date:   2/9/2025    HIV 1/2 Ag/Ab, 4TH Generation,W Rflx Confirm     Standing Status:   Future     Number of Occurrences:   1     Standing Expiration Date:   2/9/2025    Hepatitis C Antibody     Standing Status:   Future     Number of Occurrences:   1     Standing Expiration Date:   2/9/2025    buPROPion (WELLBUTRIN XL) 150 MG extended release

## 2024-02-09 ENCOUNTER — OFFICE VISIT (OUTPATIENT)
Age: 39
End: 2024-02-09

## 2024-02-09 VITALS
SYSTOLIC BLOOD PRESSURE: 123 MMHG | WEIGHT: 133.2 LBS | HEIGHT: 61 IN | HEART RATE: 79 BPM | DIASTOLIC BLOOD PRESSURE: 86 MMHG | RESPIRATION RATE: 16 BRPM | BODY MASS INDEX: 25.15 KG/M2 | TEMPERATURE: 98 F | OXYGEN SATURATION: 98 %

## 2024-02-09 DIAGNOSIS — Z00.00 ANNUAL PHYSICAL EXAM: ICD-10-CM

## 2024-02-09 DIAGNOSIS — Z11.59 SCREENING FOR VIRAL DISEASE: ICD-10-CM

## 2024-02-09 DIAGNOSIS — F33.1 MDD (MAJOR DEPRESSIVE DISORDER), RECURRENT EPISODE, MODERATE (HCC): ICD-10-CM

## 2024-02-09 DIAGNOSIS — Z11.4 SCREENING FOR HIV (HUMAN IMMUNODEFICIENCY VIRUS): ICD-10-CM

## 2024-02-09 DIAGNOSIS — Z00.00 ANNUAL PHYSICAL EXAM: Primary | ICD-10-CM

## 2024-02-09 RX ORDER — BUPROPION HYDROCHLORIDE 150 MG/1
150 TABLET ORAL EVERY MORNING
Qty: 30 TABLET | Refills: 1 | Status: SHIPPED | OUTPATIENT
Start: 2024-02-09

## 2024-02-09 SDOH — ECONOMIC STABILITY: FOOD INSECURITY: WITHIN THE PAST 12 MONTHS, YOU WORRIED THAT YOUR FOOD WOULD RUN OUT BEFORE YOU GOT MONEY TO BUY MORE.: NEVER TRUE

## 2024-02-09 SDOH — ECONOMIC STABILITY: INCOME INSECURITY: HOW HARD IS IT FOR YOU TO PAY FOR THE VERY BASICS LIKE FOOD, HOUSING, MEDICAL CARE, AND HEATING?: NOT HARD AT ALL

## 2024-02-09 SDOH — ECONOMIC STABILITY: HOUSING INSECURITY
IN THE LAST 12 MONTHS, WAS THERE A TIME WHEN YOU DID NOT HAVE A STEADY PLACE TO SLEEP OR SLEPT IN A SHELTER (INCLUDING NOW)?: NO

## 2024-02-09 SDOH — ECONOMIC STABILITY: FOOD INSECURITY: WITHIN THE PAST 12 MONTHS, THE FOOD YOU BOUGHT JUST DIDN'T LAST AND YOU DIDN'T HAVE MONEY TO GET MORE.: NEVER TRUE

## 2024-02-09 ASSESSMENT — PATIENT HEALTH QUESTIONNAIRE - PHQ9
SUM OF ALL RESPONSES TO PHQ QUESTIONS 1-9: 12
7. TROUBLE CONCENTRATING ON THINGS, SUCH AS READING THE NEWSPAPER OR WATCHING TELEVISION: 1
1. LITTLE INTEREST OR PLEASURE IN DOING THINGS: 2
2. FEELING DOWN, DEPRESSED OR HOPELESS: 2
SUM OF ALL RESPONSES TO PHQ QUESTIONS 1-9: 12
3. TROUBLE FALLING OR STAYING ASLEEP: 3
6. FEELING BAD ABOUT YOURSELF - OR THAT YOU ARE A FAILURE OR HAVE LET YOURSELF OR YOUR FAMILY DOWN: 0
4. FEELING TIRED OR HAVING LITTLE ENERGY: 3
SUM OF ALL RESPONSES TO PHQ9 QUESTIONS 1 & 2: 4
5. POOR APPETITE OR OVEREATING: 1
10. IF YOU CHECKED OFF ANY PROBLEMS, HOW DIFFICULT HAVE THESE PROBLEMS MADE IT FOR YOU TO DO YOUR WORK, TAKE CARE OF THINGS AT HOME, OR GET ALONG WITH OTHER PEOPLE: 0
9. THOUGHTS THAT YOU WOULD BE BETTER OFF DEAD, OR OF HURTING YOURSELF: 0
8. MOVING OR SPEAKING SO SLOWLY THAT OTHER PEOPLE COULD HAVE NOTICED. OR THE OPPOSITE, BEING SO FIGETY OR RESTLESS THAT YOU HAVE BEEN MOVING AROUND A LOT MORE THAN USUAL: 0

## 2024-02-10 LAB
ALBUMIN SERPL-MCNC: 4.1 G/DL (ref 3.5–5)
ALBUMIN/GLOB SERPL: 1.2 (ref 1.1–2.2)
ALP SERPL-CCNC: 59 U/L (ref 45–117)
ALT SERPL-CCNC: 15 U/L (ref 12–78)
ANION GAP SERPL CALC-SCNC: 6 MMOL/L (ref 5–15)
APPEARANCE UR: CLEAR
AST SERPL-CCNC: 14 U/L (ref 15–37)
BACTERIA URNS QL MICRO: NEGATIVE /HPF
BASOPHILS # BLD: 0 K/UL (ref 0–0.1)
BASOPHILS NFR BLD: 1 % (ref 0–1)
BILIRUB SERPL-MCNC: 0.4 MG/DL (ref 0.2–1)
BILIRUB UR QL: NEGATIVE
BUN SERPL-MCNC: 11 MG/DL (ref 6–20)
BUN/CREAT SERPL: 13 (ref 12–20)
CALCIUM SERPL-MCNC: 9.6 MG/DL (ref 8.5–10.1)
CHLORIDE SERPL-SCNC: 110 MMOL/L (ref 97–108)
CHOLEST SERPL-MCNC: 175 MG/DL
CO2 SERPL-SCNC: 24 MMOL/L (ref 21–32)
COLOR UR: ABNORMAL
CREAT SERPL-MCNC: 0.82 MG/DL (ref 0.55–1.02)
DIFFERENTIAL METHOD BLD: NORMAL
EOSINOPHIL # BLD: 0 K/UL (ref 0–0.4)
EOSINOPHIL NFR BLD: 1 % (ref 0–7)
EPITH CASTS URNS QL MICRO: ABNORMAL /LPF
ERYTHROCYTE [DISTWIDTH] IN BLOOD BY AUTOMATED COUNT: 12.7 % (ref 11.5–14.5)
GLOBULIN SER CALC-MCNC: 3.5 G/DL (ref 2–4)
GLUCOSE SERPL-MCNC: 87 MG/DL (ref 65–100)
GLUCOSE UR STRIP.AUTO-MCNC: NEGATIVE MG/DL
HCT VFR BLD AUTO: 38.5 % (ref 35–47)
HCV AB SER IA-ACNC: 0.12 INDEX
HCV AB SERPL QL IA: NONREACTIVE
HDLC SERPL-MCNC: 75 MG/DL
HDLC SERPL: 2.3 (ref 0–5)
HGB BLD-MCNC: 12.5 G/DL (ref 11.5–16)
HGB UR QL STRIP: ABNORMAL
HIV 1+2 AB+HIV1 P24 AG SERPL QL IA: NONREACTIVE
HIV 1/2 RESULT COMMENT: NORMAL
HYALINE CASTS URNS QL MICRO: ABNORMAL /LPF (ref 0–5)
IMM GRANULOCYTES # BLD AUTO: 0 K/UL (ref 0–0.04)
IMM GRANULOCYTES NFR BLD AUTO: 0 % (ref 0–0.5)
KETONES UR QL STRIP.AUTO: ABNORMAL MG/DL
LDLC SERPL CALC-MCNC: 87.2 MG/DL (ref 0–100)
LEUKOCYTE ESTERASE UR QL STRIP.AUTO: NEGATIVE
LYMPHOCYTES # BLD: 1.8 K/UL (ref 0.8–3.5)
LYMPHOCYTES NFR BLD: 34 % (ref 12–49)
MCH RBC QN AUTO: 30.5 PG (ref 26–34)
MCHC RBC AUTO-ENTMCNC: 32.5 G/DL (ref 30–36.5)
MCV RBC AUTO: 93.9 FL (ref 80–99)
MONOCYTES # BLD: 0.3 K/UL (ref 0–1)
MONOCYTES NFR BLD: 6 % (ref 5–13)
NEUTS SEG # BLD: 3.3 K/UL (ref 1.8–8)
NEUTS SEG NFR BLD: 60 % (ref 32–75)
NITRITE UR QL STRIP.AUTO: NEGATIVE
NRBC # BLD: 0 K/UL (ref 0–0.01)
NRBC BLD-RTO: 0 PER 100 WBC
PH UR STRIP: 6.5 (ref 5–8)
PLATELET # BLD AUTO: 293 K/UL (ref 150–400)
POTASSIUM SERPL-SCNC: 4.1 MMOL/L (ref 3.5–5.1)
PROT SERPL-MCNC: 7.6 G/DL (ref 6.4–8.2)
PROT UR STRIP-MCNC: NEGATIVE MG/DL
RBC # BLD AUTO: 4.1 M/UL (ref 3.8–5.2)
RBC #/AREA URNS HPF: ABNORMAL /HPF (ref 0–5)
SODIUM SERPL-SCNC: 140 MMOL/L (ref 136–145)
SP GR UR REFRACTOMETRY: 1.03 (ref 1–1.03)
TRIGL SERPL-MCNC: 64 MG/DL
TSH SERPL DL<=0.05 MIU/L-ACNC: 0.78 UIU/ML (ref 0.36–3.74)
URINE CULTURE IF INDICATED: ABNORMAL
UROBILINOGEN UR QL STRIP.AUTO: 0.2 EU/DL (ref 0.2–1)
VLDLC SERPL CALC-MCNC: 12.8 MG/DL
WBC # BLD AUTO: 5.5 K/UL (ref 3.6–11)
WBC URNS QL MICRO: ABNORMAL /HPF (ref 0–4)

## 2024-03-12 ENCOUNTER — OFFICE VISIT (OUTPATIENT)
Age: 39
End: 2024-03-12
Payer: COMMERCIAL

## 2024-03-12 VITALS
HEIGHT: 61 IN | BODY MASS INDEX: 24.81 KG/M2 | WEIGHT: 131.4 LBS | OXYGEN SATURATION: 98 % | SYSTOLIC BLOOD PRESSURE: 110 MMHG | TEMPERATURE: 97.9 F | HEART RATE: 85 BPM | RESPIRATION RATE: 15 BRPM | DIASTOLIC BLOOD PRESSURE: 77 MMHG

## 2024-03-12 DIAGNOSIS — F33.1 MDD (MAJOR DEPRESSIVE DISORDER), RECURRENT EPISODE, MODERATE (HCC): Primary | ICD-10-CM

## 2024-03-12 PROCEDURE — 99213 OFFICE O/P EST LOW 20 MIN: CPT | Performed by: INTERNAL MEDICINE

## 2024-03-12 NOTE — PROGRESS NOTES
Assessment/Plan:     1. MDD (major depressive disorder), recurrent episode, moderate (HCC)  -has taken zoloft, cymbalta and wellbutrin for treatment and has been intolerant of these medications. Her depression has been exacerbated with use of wellbutrin.  Zoloft and cymbalta did not help her symptoms.   - I agree with referring to psychiatrist to assist with medications.   -she will continue to follow with her counselor weekly.    -gave other references in addition to the recommendation given to her by her counselor.        Disclaimer:  Return if symptoms worsen or fail to improve.   Advised patient to call back or return to office if symptoms worsen/change/persist.     Discussed expected course/resolution/complications of diagnosis in detail with patient.   Medication risks/benefits/costs/interactions/alternatives discussed with patient.   Patient was given an after visit summary which includes diagnoses, current medications, & vitals.   Patient expressed understanding with the diagnosis and plan.        Subjective:      Socorro Fuller is a 38 y.o. female who presents today for follow up of her depression.      -started on wellbutrin XL 150mg daily on 2/9/2024. Has used cymbalta and zoloft in past, but intolerant of medications.      -since starting wellbutrin she has had blurry vision, headaches, crying. And increased anxiety.    -her counselor whom she is meeting once weekly referred to psychiatry-[facility]. In counseling once per week. She has also asked her employer for some time off to stabilize her depression.    -no suicidal ideations.  Her father has depression as well.     Objective:     /77   Pulse 85   Temp 97.9 °F (36.6 °C)   Resp 15   Ht 1.549 m (5' 1\")   Wt 59.6 kg (131 lb 6.4 oz)   SpO2 98%   BMI 24.83 kg/m²   Physical Exam  Constitutional:       Appearance: Normal appearance.   Neurological:      General: No focal deficit present.      Mental Status: She is alert.

## 2024-03-12 NOTE — PATIENT INSTRUCTIONS
Take wellbutrin 150mg 1/2 tab daily for 6 days. Then stop.     [facility] - [phone]    [facility] - [phone]    [facility] - [phone]    [facility] - [phone]

## 2024-08-23 ENCOUNTER — OFFICE VISIT (OUTPATIENT)
Age: 39
End: 2024-08-23

## 2024-08-23 VITALS
TEMPERATURE: 97.8 F | BODY MASS INDEX: 24.58 KG/M2 | WEIGHT: 130.2 LBS | SYSTOLIC BLOOD PRESSURE: 121 MMHG | DIASTOLIC BLOOD PRESSURE: 89 MMHG | HEIGHT: 61 IN | OXYGEN SATURATION: 99 % | HEART RATE: 74 BPM

## 2024-08-23 DIAGNOSIS — J06.9 VIRAL UPPER RESPIRATORY TRACT INFECTION: Primary | ICD-10-CM

## 2024-08-23 RX ORDER — BUPROPION HYDROCHLORIDE 150 MG/1
150 TABLET, EXTENDED RELEASE ORAL 2 TIMES DAILY
COMMUNITY

## 2024-08-23 RX ORDER — TRAZODONE HYDROCHLORIDE 50 MG/1
50 TABLET ORAL NIGHTLY
COMMUNITY

## 2024-08-23 RX ORDER — BENZONATATE 200 MG/1
200 CAPSULE ORAL 2 TIMES DAILY PRN
Qty: 14 CAPSULE | Refills: 0 | Status: SHIPPED | OUTPATIENT
Start: 2024-08-23 | End: 2024-08-30

## 2024-08-23 RX ORDER — FLUOXETINE HYDROCHLORIDE 20 MG/1
40 CAPSULE ORAL DAILY
COMMUNITY

## 2024-08-23 NOTE — PROGRESS NOTES
Subjective:       History was provided by the patient.  Socorro Fuller is a 39 y.o. female who presents for evaluation of symptoms of a URI. Symptoms include chest congestion, dry cough, hoarseness, sore throat, and fatigue . Onset of symptoms was 6 days ago, unchanged since that time. Associated symptoms include congestion, nasal congestion, non productive cough, sinus pressure, and sore throat.  She is drinking plenty of fluids. Evaluation to date: none. Treatment to date: Nyquil, Dayquil, Betty Villareal   Patient's medications, allergies, past medical, surgical, social and family histories were reviewed and updated as appropriate.    Review of Systems  Pertinent items are noted in HPI.      Objective:      General appearance: alert, appears stated age, and cooperative  Ears: normal TM's and external ear canals both ears  Nose: no discharge, turbinates normal, no sinus tenderness  Throat: normal findings: pink and moist  Lungs: clear to auscultation bilaterally  Heart: S1, S2 normal  Lymph nodes: Cervical adenopathy: pink and moist         Assessment:      1. Viral upper respiratory tract infection  Patient appears well, VSS ,afebrile, SPO2 99% on room air. No distress noted.  Ears normal.  Throat and pharynx normal.  Neck supple. No adenopathy in the neck. Nose is congested. Sinuses non tender. The chest is clear, without wheezes or rales.    Patient is previously healthy and immunocompetent, presenting with symptoms suspicious for likely viral upper respiratory infection.  Differential includes acute bronchitis, rhinosinusitis, allergic rhinitis, bacterial pneumonia, or COVID.  Do not suspect underlying cardiopulmonary process.  I considered, but think unlikely, dangerous causes of this patient's symptoms to include ACS, CHF or COPD exacerbations, pneumonia, pneumothorax.  Patient is nontoxic appearing and not in need of emergent medical intervention.    The patient is a good candidate for outpatient therapy based

## 2024-08-23 NOTE — PATIENT INSTRUCTIONS
Thank you for visiting [facility] today    Nasal Congestion:  Flonase or Nasonex (over the counter) nasal spray, once a day  Saline irrigation kits help wash out sinuses 1-2 times a day  Normal saline nasal spray  Cough:  Throat lozenges, hot tea, and honey may help  Vicks VapoRub at night to help with cough and relieve muscles aches and pain  If you have high blood pressure, take Coricidin HBP (or the generic form) instead.  Follow instructions on the box.  Congestion:  Take Advil Cold and Sinus to help thin the mucus.  Follow instructions on the box.  You will need to drink plenty of water with this medication.  Sore Throat:  Lozenges, as needed. Cepacol lozenges will help numb the throat  Chloraseptic spray also helps to numb throat pain  Salt water gargles to soothe throat pain  Sinus pain/pressure:  Warm, wet towel on face to help with facial sinus pain/pressure  Headache/Pain Fever/Body Aches:  If you can take NSAIDs, take Ibuprofen 400-800mg every 8 hours as needed  If you cannot take NSAIDs, take Tylenol 325-500mg every 6 hours as needed  Miscellanous:  Zyrtec/Xyzal/Allegra/Claritin during the day.  You  may also use the decongestant version of these medications.   Simple foods like chicken noodle soup, smoothies, hot tea with lemon and honey may also help  Avoid smoking  Minimize exposure to irritants    Please follow up with your primary care provider within 2-5 days if your signs and symptoms have not resolved or worsened.    Please go immediately to the Emergency Department if you develop shortness of breath, chest pain and uncontrollable fever above 100.4F.